# Patient Record
Sex: FEMALE | Race: WHITE | URBAN - METROPOLITAN AREA
[De-identification: names, ages, dates, MRNs, and addresses within clinical notes are randomized per-mention and may not be internally consistent; named-entity substitution may affect disease eponyms.]

---

## 2022-03-22 ENCOUNTER — HOSPITAL ENCOUNTER (EMERGENCY)
Age: 37
Discharge: HOME | End: 2022-03-22
Attending: EMERGENCY MEDICINE
Payer: COMMERCIAL

## 2022-03-22 ENCOUNTER — APPOINTMENT (OUTPATIENT)
Dept: GENERAL RADIOLOGY | Age: 37
End: 2022-03-22
Payer: COMMERCIAL

## 2022-03-22 VITALS
BODY MASS INDEX: 34.86 KG/M2 | WEIGHT: 230 LBS | DIASTOLIC BLOOD PRESSURE: 98 MMHG | HEART RATE: 72 BPM | SYSTOLIC BLOOD PRESSURE: 155 MMHG | RESPIRATION RATE: 16 BRPM | HEIGHT: 68 IN | OXYGEN SATURATION: 97 % | TEMPERATURE: 98 F

## 2022-03-22 DIAGNOSIS — S91.215A: Primary | ICD-10-CM

## 2022-03-22 DIAGNOSIS — S90.222A SUBUNGUAL HEMATOMA OF TOE OF LEFT FOOT, INITIAL ENCOUNTER: ICD-10-CM

## 2022-03-22 PROCEDURE — 99284 EMERGENCY DEPT VISIT MOD MDM: CPT

## 2022-03-22 PROCEDURE — 11740 EVACUATION SUBUNGUAL HMTMA: CPT

## 2022-03-22 PROCEDURE — 73660 X-RAY EXAM OF TOE(S): CPT

## 2022-03-22 RX ORDER — SULFAMETHOXAZOLE AND TRIMETHOPRIM 800; 160 MG/1; MG/1
1 TABLET ORAL 2 TIMES DAILY
Qty: 20 TABLET | Refills: 0 | Status: SHIPPED | OUTPATIENT
Start: 2022-03-22 | End: 2022-04-01

## 2022-03-22 ASSESSMENT — ENCOUNTER SYMPTOMS
VOMITING: 0
COUGH: 0
DIARRHEA: 0
SINUS PRESSURE: 0
RHINORRHEA: 0
ABDOMINAL PAIN: 0
SORE THROAT: 0
SHORTNESS OF BREATH: 0
WHEEZING: 0
CONSTIPATION: 0
NAUSEA: 0

## 2022-03-23 NOTE — ED NOTES
Bulky dressing applied to L foot 2nd toe, petroleum, non-adherent, gauze wrapped. Post op shoes. Discussed wound care and follow up.       Crys Parra RN  03/22/22 5830

## 2022-03-23 NOTE — ED PROVIDER NOTES
Emergency Department Encounter    Patient: Lex Whitfield  MRN: [id number]  : 1985  Date of Evaluation: 3/22/2022  ED Provider:  Preeti Putnam DO    Triage Chief Complaint:   Toe Injury (L foot 2nd toe daughter dropped yeti full of ice)    HPI:  Lex Whitfield is a 31 y.o. female with past medical history significant for liver disease, not on anticoagulation who presents with left second toe injury. Patient states that her daughter dropped a yeti cup full of ice on her second toe earlier tonight. When it did not stop bleeding, patient came into the emergency department. She does states that hemostasis has been achieved now. Patient has been able to bear weight on the foot. Denies any other trauma or injury. Denies F/C, CP, SOB, palpitations, N/V, abdominal pain, dysuria, diarrhea and constipatin. ROS:  Review of Systems   Constitutional: Negative for chills and fever. HENT: Negative for congestion, rhinorrhea, sinus pressure and sore throat. Eyes: Negative for visual disturbance. Respiratory: Negative for cough, shortness of breath and wheezing. Cardiovascular: Negative for chest pain and palpitations. Gastrointestinal: Negative for abdominal pain, constipation, diarrhea, nausea and vomiting. Genitourinary: Negative for dysuria, frequency and urgency. Musculoskeletal: Negative for arthralgias and myalgias. Toe injury   Skin: Negative for rash. Neurological: Negative for dizziness and syncope. Psychiatric/Behavioral: Negative for agitation, behavioral problems and confusion. Past Medical History:   Diagnosis Date    Liver disease 2019     History reviewed. No pertinent surgical history. History reviewed. No pertinent family history.   Social History     Socioeconomic History    Marital status:      Spouse name: Not on file    Number of children: Not on file    Years of education: Not on file    Highest education level: Not on file Occupational History    Not on file   Tobacco Use    Smoking status: Never Smoker    Smokeless tobacco: Never Used   Substance and Sexual Activity    Alcohol use: Yes     Comment: occ    Drug use: Never    Sexual activity: Not on file   Other Topics Concern    Not on file   Social History Narrative    Not on file     Social Determinants of Health     Financial Resource Strain:     Difficulty of Paying Living Expenses: Not on file   Food Insecurity:     Worried About Running Out of Food in the Last Year: Not on file    Moy of Food in the Last Year: Not on file   Transportation Needs:     Lack of Transportation (Medical): Not on file    Lack of Transportation (Non-Medical): Not on file   Physical Activity:     Days of Exercise per Week: Not on file    Minutes of Exercise per Session: Not on file   Stress:     Feeling of Stress : Not on file   Social Connections:     Frequency of Communication with Friends and Family: Not on file    Frequency of Social Gatherings with Friends and Family: Not on file    Attends Syn[employer] Services: Not on file    Active Member of [address] [place] [address] or Organizations: Not on file    Attends Club or Organization Meetings: Not on file    Marital Status: Not on file   Intimate Partner Violence:     Fear of Current or Ex-Partner: Not on file    Emotionally Abused: Not on file    Physically Abused: Not on file    Sexually Abused: Not on file   Housing Stability:     Unable to Pay for Housing in the Last Year: Not on file    Number of [place] in the Last Year: Not on file    Unstable Housing in the Last Year: Not on file     No current facility-administered medications for this encounter. No current outpatient medications on file.      Allergies   Allergen Reactions    Ceclor [Cefaclor] Other (See Comments)     Child    Demerol Hcl [Meperidine] Other (See Comments)     Child, limb went numb/paralyzed         Nursing Notes Reviewed    Physical Exam:  Triage VS: ED Triage Vitals [03/22/22 1934]   Enc Vitals Group      BP (!) 155/98      Pulse 72      Resp 16      Temp 98 °F (36.7 °C)      Temp Source Temporal      SpO2 97 %      Weight 230 lb (104.3 kg)      Height 5' 8\" (1.727 m)      Head Circumference       Peak Flow       Pain Score       Pain Loc       Pain Edu? Excl. in [address]? Physical Exam  Vitals and nursing note reviewed. Constitutional:       Appearance: Normal appearance. HENT:      Head: Normocephalic and atraumatic. Nose: Nose normal.      Mouth/Throat:      Mouth: Mucous membranes are moist.   Eyes:      Conjunctiva/sclera: Conjunctivae normal.   Cardiovascular:      Rate and Rhythm: Normal rate and regular rhythm. Pulses: Normal pulses. Pulmonary:      Effort: Pulmonary effort is normal.      Breath sounds: Normal breath sounds. Abdominal:      Palpations: Abdomen is soft. Musculoskeletal:      Right foot: Normal.      Comments: Left foot:  DP, PT pulses and distal capillary refill intact. Subungual hematoma appreciated of the left second toe with small, less than 1 cm laceration to skin fold. There is small crack in patient's nail. There is no active bleeding appreciated. Patient's toe is tender to palpation. Skin:     General: Skin is warm. Capillary Refill: Capillary refill takes less than 2 seconds. Neurological:      Mental Status: She is alert and oriented to person, place, and time. Mental status is at baseline. Psychiatric:         Mood and Affect: Mood normal.          Radiographs (if obtained):    [] Radiologist's Report Reviewed:  XR TOE LEFT (MIN 2 VIEWS)   Final Result   No acute osseous abnormality. Chart review shows recent radiographs:  No results found. MDM:  Patient seen and examined. Plain films obtained and without acute process. Patient with small laceration to nail fold. Hemostasis is achieved with direct pressure.   There was subungual hematoma appreciated, however with trepanation, minimal, scant blood was appreciated. Decision made to place bulky dressing on toe, and place patient in a postop shoe for support. She is provided with podiatry for referral tomorrow. Given laceration and trepidation, patient is placed on antibiotics. Patient's vital signs with acceptable limits, at this time I do think she is appropriate for outpatient follow-up. Patient's foot is neurovascularly intact patient follow-up with primary care provider in 1 to 2 days and return to emergency department symptoms worsen. Trephination was performed in a sterile fashion. Patient's toe was cleaned and prepped with Betadine. An electric Bovie cautery was used to make a small hole in patient's toe, with scant bloody discharge appreciated. Patient did tolerate procedure well. A clean, bulky gauze dressing was placed on Toe. Informed consent was obtained verbally, patient verbalizes risks and benefits and understanding of procedure, including bleeding, damage to surrounding structure, including poor cosmetic outcome, and losing the toenail, and infection. Patient agrees to procedure. Clinical Impression:  1. Laceration of lesser toe of left foot with damage to nail, foreign body presence unspecified, initial encounter    2. Subungual hematoma of toe of left foot, initial encounter      Disposition referral (if applicable):  Flor Clark DPM  [address]  [phone]    Schedule an appointment as soon as possible for a visit in 1 day      Yani Massey, [address] [place] [address]  [phone]    Schedule an appointment as soon as possible for a visit in 1 day      [address]  [phone]  Go to   As needed, If symptoms worsen    Disposition medications (if applicable):  New Prescriptions    SULFAMETHOXAZOLE-TRIMETHOPRIM (BACTRIM DS) 800-160 MG PER TABLET Take 1 tablet by mouth 2 times daily for 10 days       Comment: Please note this report has been produced using speech recognition software and may contain errors related to that system including errors in grammar, punctuation, and spelling, as well as words and phrases that may be inappropriate. Efforts were made to edit the dictations.               [id number] [facility],   03/22/22 1953

## 2023-12-25 ENCOUNTER — HOSPITAL ENCOUNTER (EMERGENCY)
Age: 38
Discharge: HOME | End: 2023-12-25
Attending: EMERGENCY MEDICINE
Payer: COMMERCIAL

## 2023-12-25 VITALS
RESPIRATION RATE: 17 BRPM | HEIGHT: 68 IN | HEART RATE: 79 BPM | BODY MASS INDEX: 29.55 KG/M2 | SYSTOLIC BLOOD PRESSURE: 138 MMHG | WEIGHT: 195 LBS | DIASTOLIC BLOOD PRESSURE: 90 MMHG | TEMPERATURE: 97.6 F | OXYGEN SATURATION: 99 %

## 2023-12-25 DIAGNOSIS — J32.9 CHRONIC SINUSITIS, UNSPECIFIED LOCATION: ICD-10-CM

## 2023-12-25 DIAGNOSIS — H92.01 OTALGIA OF RIGHT EAR: Primary | ICD-10-CM

## 2023-12-25 DIAGNOSIS — H65.01 NON-RECURRENT ACUTE SEROUS OTITIS MEDIA OF RIGHT EAR: ICD-10-CM

## 2023-12-25 PROCEDURE — 99283 EMERGENCY DEPT VISIT LOW MDM: CPT

## 2023-12-25 RX ORDER — FLUTICASONE PROPIONATE 50 MCG
2 SPRAY, SUSPENSION (ML) NASAL DAILY
Qty: 16 G | Refills: 0 | Status: SHIPPED | OUTPATIENT
Start: 2023-12-25

## 2023-12-25 RX ORDER — NAPROXEN 500 MG/1
500 TABLET ORAL 2 TIMES DAILY PRN
Qty: 20 TABLET | Refills: 0 | Status: SHIPPED | OUTPATIENT
Start: 2023-12-25 | End: 2024-01-04

## 2023-12-25 RX ORDER — GUAIFENESIN AND DEXTROMETHORPHAN HYDROBROMIDE 1200; 60 MG/1; MG/1
1 TABLET, EXTENDED RELEASE ORAL 2 TIMES DAILY PRN
Qty: 28 TABLET | Refills: 0 | Status: SHIPPED | OUTPATIENT
Start: 2023-12-25

## 2023-12-25 RX ORDER — AMOXICILLIN AND CLAVULANATE POTASSIUM 500; 125 MG/1; MG/1
1 TABLET, FILM COATED ORAL 3 TIMES DAILY
Qty: 30 TABLET | Refills: 0 | Status: SHIPPED | OUTPATIENT
Start: 2023-12-25 | End: 2024-01-04

## 2023-12-25 NOTE — DISCHARGE INSTRUCTIONS
You can take Tylenol as needed, as directed for pain not relieved by Naprosyn DS. Return if symptoms change or worsen.

## 2023-12-25 NOTE — ED NOTES
Discharge instructions reviewed with patient. Medications and follow up were discussed.  Patient denies further questions and verbalizes understanding

## 2025-05-24 ENCOUNTER — HOSPITAL ENCOUNTER (EMERGENCY)
Age: 40
Discharge: HOME | End: 2025-05-24
Attending: EMERGENCY MEDICINE
Payer: COMMERCIAL

## 2025-05-24 VITALS
WEIGHT: 200 LBS | RESPIRATION RATE: 18 BRPM | HEART RATE: 79 BPM | DIASTOLIC BLOOD PRESSURE: 66 MMHG | BODY MASS INDEX: 30.41 KG/M2 | OXYGEN SATURATION: 99 % | TEMPERATURE: 97.3 F | SYSTOLIC BLOOD PRESSURE: 130 MMHG

## 2025-05-24 DIAGNOSIS — R31.9 URINARY TRACT INFECTION WITH HEMATURIA, SITE UNSPECIFIED: Primary | ICD-10-CM

## 2025-05-24 DIAGNOSIS — N89.8 VAGINAL DISCHARGE: ICD-10-CM

## 2025-05-24 DIAGNOSIS — N39.0 URINARY TRACT INFECTION WITH HEMATURIA, SITE UNSPECIFIED: Primary | ICD-10-CM

## 2025-05-24 LAB
BACTERIA URNS QL MICRO: ABNORMAL
BILIRUB UR QL STRIP: NEGATIVE
CHARACTER UR: ABNORMAL
CLARITY UR: ABNORMAL
CLUE CELLS VAG QL WET PREP: NORMAL
COLOR UR: YELLOW
EPI CELLS #/AREA URNS HPF: ABNORMAL /HPF
GLUCOSE UR STRIP-MCNC: NEGATIVE MG/DL
HCG UR QL: NEGATIVE
HGB UR QL STRIP.AUTO: ABNORMAL
KETONES UR STRIP-MCNC: NEGATIVE MG/DL
LEUKOCYTE ESTERASE UR QL STRIP: ABNORMAL
NITRITE UR QL STRIP: POSITIVE
PH UR STRIP: 6.5 [PH] (ref 5–8)
PROT UR STRIP-MCNC: 30 MG/DL
RBC #/AREA URNS HPF: ABNORMAL /HPF
SOURCE WET PREP: NORMAL
SP GR UR STRIP: 1.02 (ref 1–1.03)
SPERM, URINE: ABNORMAL
T VAGINALIS VAG QL WET PREP: NORMAL
UROBILINOGEN UR STRIP-ACNC: 1 EU/DL (ref 0–1)
WBC #/AREA URNS HPF: ABNORMAL /HPF
YEAST WET PREP: NORMAL

## 2025-05-24 PROCEDURE — 87591 N.GONORRHOEAE DNA AMP PROB: CPT

## 2025-05-24 PROCEDURE — 87077 CULTURE AEROBIC IDENTIFY: CPT

## 2025-05-24 PROCEDURE — 81001 URINALYSIS AUTO W/SCOPE: CPT

## 2025-05-24 PROCEDURE — 84703 CHORIONIC GONADOTROPIN ASSAY: CPT

## 2025-05-24 PROCEDURE — 87210 SMEAR WET MOUNT SALINE/INK: CPT

## 2025-05-24 PROCEDURE — 87086 URINE CULTURE/COLONY COUNT: CPT

## 2025-05-24 PROCEDURE — 6370000000 HC RX 637 (ALT 250 FOR IP): Performed by: EMERGENCY MEDICINE

## 2025-05-24 PROCEDURE — 99283 EMERGENCY DEPT VISIT LOW MDM: CPT

## 2025-05-24 PROCEDURE — 87491 CHLMYD TRACH DNA AMP PROBE: CPT

## 2025-05-24 PROCEDURE — 87186 SC STD MICRODIL/AGAR DIL: CPT

## 2025-05-24 RX ORDER — PHENAZOPYRIDINE HYDROCHLORIDE 100 MG/1
200 TABLET, FILM COATED ORAL ONCE
Status: COMPLETED | OUTPATIENT
Start: 2025-05-24 | End: 2025-05-24

## 2025-05-24 RX ORDER — NITROFURANTOIN 25; 75 MG/1; MG/1
100 CAPSULE ORAL ONCE
Status: COMPLETED | OUTPATIENT
Start: 2025-05-24 | End: 2025-05-24

## 2025-05-24 RX ORDER — PHENAZOPYRIDINE HYDROCHLORIDE 200 MG/1
200 TABLET, FILM COATED ORAL 3 TIMES DAILY PRN
Qty: 9 TABLET | Refills: 0 | Status: SHIPPED | OUTPATIENT
Start: 2025-05-24 | End: 2025-05-27

## 2025-05-24 RX ORDER — NITROFURANTOIN 25; 75 MG/1; MG/1
100 CAPSULE ORAL 2 TIMES DAILY
Qty: 10 CAPSULE | Refills: 0 | Status: SHIPPED | OUTPATIENT
Start: 2025-05-24 | End: 2025-05-29

## 2025-05-24 RX ADMIN — NITROFURANTOIN MONOHYDRATE/MACROCRYSTALS 100 MG: 75; 25 CAPSULE ORAL at 20:58

## 2025-05-24 RX ADMIN — PHENAZOPYRIDINE 200 MG: 100 TABLET ORAL at 20:58

## 2025-05-25 NOTE — ED PROVIDER NOTES
CHIEF COMPLAINT    Chief Complaint   Patient presents with    Dysuria     HPI  Sheri Lugo is a 39 y.o. female with history of PCOS who presents to the ED with complaints of dysuria and vaginal discharge.  Patient states that since yesterday she has been experiencing burning with urination and urinary frequency.  She also describes a pressure sensation near the pelvis with slight increased vaginal discharge over the last few days.  She states she does have a history of PCOS and will experience pelvic cramping at times but she feels that this burning is different than usual and the discharge is more than usual.  Symptoms are rated as moderate in severity and constant.  Patient not explicitly concerned about STDs.  Denies abdominal pain, fevers, chills, nausea, vomiting, flank pain      REVIEW OF SYSTEMS  Constitutional: No fever, chills  Eye: No visual changes  HENT: No earache or sore throat.  Resp: No SOB or productive cough.  Cardio: No chest pain or palpitations.  GI: No abdominal pain, nausea, vomiting, constipation or diarrhea. No melena.  : Complains of pelvic pressure, dysuria, vaginal discharge  Endocrine: No heat intolerance, no cold intolerance, no polydipsia   Lymphatics: No adenopathy  Musculoskeletal: No new muscle aches or joint pain.  Neuro: No headaches.  Psych: No homicidal or suicidal thoughts  Skin: No rash, No itching.  ?  ?  PAST MEDICAL HISTORY  Past Medical History:   Diagnosis Date    Liver disease 2019     FAMILY HISTORY  History reviewed. No pertinent family history.  SOCIAL HISTORY  Social History     Socioeconomic History    Marital status:      Spouse name: None    Number of children: None    Years of education: None    Highest education level: None   Tobacco Use    Smoking status: Never    Smokeless tobacco: Never   Substance and Sexual Activity    Alcohol use: Yes     Comment: occ    Drug use: Never       SURGICAL HISTORY  History reviewed. No pertinent surgical

## 2025-05-25 NOTE — DISCHARGE INSTR - COC
Continuity of Care Form    Patient Name: Sheri Lugo   :  1985  MRN:  [id number]    Admit date:  2025  Discharge date:  ***    Code Status Order: No Order   Advance Directives:     Admitting Physician:  No admitting provider for patient encounter.  PCP: Elvis Harrison MD    Discharging Nurse: ***  Discharging Hospital Unit/Room#: ED-E01  Discharging Unit Phone Number: ***    Emergency Contact:   Extended Emergency Contact Information  Primary Emergency Contact: Maxx Lugo  Mobile Phone: [phone]  Relation: Spouse  Preferred language: English   needed? No    Past Surgical History:  History reviewed. No pertinent surgical history.    Immunization History:   Immunization History   Administered Date(s) Administered    COVID-19, PFIZER PURPLE top, DILUTE for use, (age 12 y+), 30mcg/0.3mL 10/16/2021, 2021       Active Problems:  There is no problem list on file for this patient.      Isolation/Infection:   Isolation            No Isolation          Patient Infection Status    None to display         Nurse Assessment:  Last Vital Signs: /66   Pulse 79   Temp 97.3 °F (36.3 °C)   Resp 18   Wt 90.7 kg (200 lb)   SpO2 99%   BMI 30.41 kg/m²     Last documented pain score (0-10 scale):    Last Weight:   Wt Readings from Last 1 Encounters:   25 90.7 kg (200 lb)     Mental Status:  {IP PT MENTAL STATUS:}    IV Access:  { PARVEZ IV ACCESS:521209825}    Nursing Mobility/ADLs:  Walking   {CHP DME ADLs:825224184}  Transfer  {CHP DME ADLs:831858539}  Bathing  {CHP DME ADLs:137674463}  Dressing  {CHP DME ADLs:901713738}  Toileting  {CHP DME ADLs:287076830}  Feeding  {CHP DME ADLs:069589615}  Med Admin  {CHP DME ADLs:137933717}  Med Delivery   { PARVEZ MED Delivery:354689602}    Wound Care Documentation and Therapy:        Elimination:  Continence:   Bowel: {YES / NO:}  Bladder: {YES / NO:}  Urinary Catheter: {Urinary Catheter:365258715}

## 2025-05-26 LAB
CHLAMYDIA TRACHOMATIS MOLECULAR: NOT DETECTED
NEISSERIA GONORRHOEAE MOLECULAR: NOT DETECTED
SOURCE: NORMAL
TRICHOMONAS VAGINALIS, MOLECULAR: NOT DETECTED

## 2025-05-27 LAB
MICROORGANISM SPEC CULT: ABNORMAL
SERVICE CMNT-IMP: ABNORMAL
SPECIMEN DESCRIPTION: ABNORMAL